# Patient Record
Sex: FEMALE | NOT HISPANIC OR LATINO | URBAN - METROPOLITAN AREA
[De-identification: names, ages, dates, MRNs, and addresses within clinical notes are randomized per-mention and may not be internally consistent; named-entity substitution may affect disease eponyms.]

---

## 2023-11-03 ENCOUNTER — TELEPHONE (OUTPATIENT)
Dept: PRIMARY CARE | Facility: CLINIC | Age: 53
End: 2023-11-03

## 2023-11-03 NOTE — TELEPHONE ENCOUNTER
Rx Refill Request Telephone Encounter    Patient would like this medication because she is flying and will not make threw the flight because she is afraid of flying.       Name:  Maria Guadalupe Quintana  :  [id number]  Medication Name:  xanax  Specific Pharmacy location: [facility]  Date of last appointment:  8/15/23  Date of next appointment:   Best number to reach patient:  [phone]

## 2025-06-17 ENCOUNTER — ANCILLARY PROCEDURE (OUTPATIENT)
Dept: ORTHOPEDIC SURGERY | Facility: CLINIC | Age: 55
End: 2025-06-17
Payer: COMMERCIAL

## 2025-06-17 ENCOUNTER — OFFICE VISIT (OUTPATIENT)
Dept: ORTHOPEDIC SURGERY | Facility: CLINIC | Age: 55
End: 2025-06-17
Payer: COMMERCIAL

## 2025-06-17 DIAGNOSIS — M25.521 ELBOW PAIN, RIGHT: ICD-10-CM

## 2025-06-17 PROCEDURE — 99204 OFFICE O/P NEW MOD 45 MIN: CPT | Performed by: STUDENT IN AN ORGANIZED HEALTH CARE EDUCATION/TRAINING PROGRAM

## 2025-06-17 PROCEDURE — 73080 X-RAY EXAM OF ELBOW: CPT | Mod: RIGHT SIDE | Performed by: STUDENT IN AN ORGANIZED HEALTH CARE EDUCATION/TRAINING PROGRAM

## 2025-06-17 RX ORDER — ROSUVASTATIN CALCIUM 5 MG/1
5 TABLET, COATED ORAL
COMMUNITY
Start: 2025-05-02 | End: 2026-05-02

## 2025-06-17 RX ORDER — ALPRAZOLAM 0.25 MG/1
0.25 TABLET ORAL AS NEEDED
COMMUNITY
Start: 2017-04-27

## 2025-06-30 NOTE — PROGRESS NOTES
PRIMARY CARE PHYSICIAN: No primary care provider on file.  REFERRING PROVIDER: No referring provider defined for this encounter.     CONSULT ORTHOPAEDIC: Elbow Evaluation    ASSESSMENT & PLAN    Impression: 54 y.o. female with several months of right elbow pain.  This time, based on clinical history, physical examination, and radiographic imaging I believe the patient is suffering from some mild ulnohumeral joint osteoarthritis.  We discussed this today.  We discussed ongoing management with oral anti-inflammatories and Tylenol for pain control.  We have provided her a home exercise program to work on strengthening and range of motion.  She will follow-up on an as-needed basis moving forward.    Plan:   I explained to the patient the nature of their diagnosis.  I reviewed their imaging studies with them.    Based on the history, physical exam and imaging studies above, the patient's presentation is consistent with consistent with the above diagnosis.  I had a long discussion with the patient regarding their presentation and the treatment options.  We discussed initial nonoperative versus operative management options as well as potential further diagnostic imaging.    Follow-Up: Patient will follow-up as needed moving forward.    At the end of the visit, all questions were answered in full. The patient is in agreement with the plan and recommendations. They will call the office with any questions/concerns.    Note dictated with [employer] software. Completed without full typed error editing and sent to avoid delay.     SUBJECTIVE  CHIEF COMPLAINT:   Chief Complaint   Patient presents with    Right Elbow - Pain     NPV-lateral, can go down to wrist, 2 months, P-5/10, R handed        HPI: Maria Guadalupe Quintana is a 54 y.o. patient. Maria Guadalupe Quintana complains of lateral sided elbow pain.  The patient reports several months of atraumatic right lateral sided elbow pain.  She has no recent history of injury/falls.   No reports of distal numbness or tingling.  She has no previous history of surgery or injections to the elbow.  She has a family history of bony malignancy and is concerned/presents today to obtain x-rays and discuss further management options.  She has not tried any home exercise program or physical therapy.  They deny any associated neck pain.  No numbness, tingling, or paresthesias.    REVIEW OF SYSTEMS  Constitutional: See HPI for pain assessment, No significant weight loss, recent trauma  Cardiovascular: No chest pain, shortness of breath  Respiratory: No difficulty breathing, cough  Gastrointestinal: No nausea, vomiting, diarrhea, constipation  Musculoskeletal: Noted in HPI, positive for pain, restricted motion, stiffness  Integumentary: No rashes, easy bruising, redness   Neurological: no numbness or tingling in extremities, no gait disturbances   Psychiatric: No mood changes, memory changes, social issues  Heme/Lymph: no excessive swelling, easy bruising, excessive bleeding  ENT: No hearing changes  Eyes: No vision changes    Medical History[1]     Allergies[2]     Surgical History[3]     Family History[4]     Social History     Socioeconomic History    Marital status:      Spouse name: Not on file    Number of children: Not on file    Years of education: Not on file    Highest education level: Not on file   Occupational History    Not on file   Tobacco Use    Smoking status: Not on file    Smokeless tobacco: Not on file   Substance and Sexual Activity    Alcohol use: Not on file    Drug use: Not on file    Sexual activity: Not on file   Other Topics Concern    Not on file   Social History Narrative    Not on file     Social Drivers of Health     Financial Resource Strain: Not on file   Food Insecurity: Not on file   Transportation Needs: Not on file   Physical Activity: Not on file   Stress: Not on file   Social Connections: Not on file   Intimate Partner Violence: Not on file   Housing Stability:  "Not on file        CURRENT MEDICATIONS:   Current Medications[5]     OBJECTIVE    PHYSICAL EXAM      5/4/2022     2:40 PM 7/14/2022     8:00 AM 8/15/2022     4:07 PM   Vitals   Systolic 132 122 120   Diastolic 75 76 72   Heart Rate 77 76 88   Temp 36.8 °C (98.2 °F)  36.2 °C (97.1 °F)   Height 1.727 m (5' 8\") 1.727 m (5' 8\") 1.727 m (5' 8\")   Weight (lb) 128.2 133 131   BMI 19.49 kg/m2 20.22 kg/m2 19.92 kg/m2   BSA (m2) 1.67 m2 1.7 m2 1.69 m2      There is no height or weight on file to calculate BMI.    GENERAL: A/Ox3, NAD. Appears healthy, well nourished  PSYCHIATRIC: Mood stable, appropriate memory recall  EYES: EOM intact, no scleral icterus  CARDIOVASCULAR: Palpable peripheral pulses  LUNGS: Breathing non-labored on room air  SKIN: no erythema, rashes, or ecchymosis     MUSCULOSKELETAL:  Laterality: right Elbow Exam  - Negative Spurlings, full painless neck and shoudler ROM  - Skin intact  - No erythema or warmth  - No ecchymosis or soft tissue swelling  - Elbow ROM: Full and symmetric compared to the contralateral extremity.  - Strength:      Flexion 5/5     Extension 5/5     Pronation/supination 5/5  - Palpation: There is no tenderness palpation along the medial or lateral condyle.  - Stability: Varus/valgus stable  - Special Tests: None    NEUROVASCULAR:  - Neurovascular Status: sensation intact to light touch distally, upper extremity motor grossly intact  - Capillary refill brisk at extremities, Bilateral radial pulses 2+    Imaging: Multiple views of the affected right elbow(s) demonstrate: There is no evidence of acute fracture or dislocation.  There is some evidence of mild ulnohumeral joint space narrowing.   Images were personally reviewed and interpreted by me.  Radiology reports were reviewed by me as well, if readily available at the time.    Carlos Gates MD, MPH  Attending Surgeon    Sports Medicine Orthopaedic Surgery  [facility] " [place]  [facility] [employer]        [1]   Past Medical History:  Diagnosis Date    Cutaneous abscess of neck 07/03/2017    Abscess, neck   [2] No Known Allergies  [3]   Past Surgical History:  Procedure Laterality Date    OTHER SURGICAL HISTORY  08/29/2019    Eye surgery   [4] No family history on file.  [5]   Current Outpatient Medications   Medication Sig Dispense Refill    ALPRAZolam (Xanax) 0.25 mg tablet Take 1 tablet (0.25 mg) by mouth if needed.      rosuvastatin (Crestor) 5 mg tablet Take 1 tablet (5 mg) by mouth once daily.       No current facility-administered medications for this visit.